# Patient Record
Sex: FEMALE | Race: WHITE | Employment: OTHER | RURAL
[De-identification: names, ages, dates, MRNs, and addresses within clinical notes are randomized per-mention and may not be internally consistent; named-entity substitution may affect disease eponyms.]

---

## 2017-10-05 ENCOUNTER — TELEPHONE (OUTPATIENT)
Dept: FAMILY MEDICINE CLINIC | Age: 62
End: 2017-10-05

## 2017-10-05 ENCOUNTER — OFFICE VISIT (OUTPATIENT)
Dept: FAMILY MEDICINE CLINIC | Age: 62
End: 2017-10-05

## 2017-10-05 VITALS
OXYGEN SATURATION: 98 % | HEIGHT: 60 IN | SYSTOLIC BLOOD PRESSURE: 166 MMHG | RESPIRATION RATE: 18 BRPM | TEMPERATURE: 97.8 F | WEIGHT: 133.6 LBS | HEART RATE: 82 BPM | DIASTOLIC BLOOD PRESSURE: 92 MMHG | BODY MASS INDEX: 26.23 KG/M2

## 2017-10-05 DIAGNOSIS — B02.9 HERPES ZOSTER WITHOUT COMPLICATION: Primary | ICD-10-CM

## 2017-10-05 RX ORDER — FAMCICLOVIR 500 MG/1
500 TABLET ORAL 3 TIMES DAILY
Qty: 21 TAB | Refills: 0 | Status: SHIPPED | OUTPATIENT
Start: 2017-10-05 | End: 2017-10-12

## 2017-10-05 NOTE — PROGRESS NOTES
Chief Complaint   Patient presents with    Rash     sternum and back         HPI:         is a 57 y.o. female who notes the onset of a skin problem. The details are as follows: Woke up today with right upper back pain. Noticed a rash on her right breast.  Came to the office. Tired. No fever. Painful over areas where rash is visible. No Known Allergies    Current Outpatient Prescriptions   Medication Sig    famciclovir (FAMVIR) 500 mg tablet Take 1 Tab by mouth three (3) times daily for 7 days.  escitalopram (LEXAPRO) 10 mg tablet TAKE ONE TABLET BY MOUTH EVERY DAY     No current facility-administered medications for this visit. Past Medical History:   Diagnosis Date    Alcoholic ([address]     Depression      2          ROS:  Denies fever, chills, cough, chest pain, SOB,  nausea, vomiting, or diarrhea. Denies wt loss, wt gain, hemoptysis, hematochezia or melena. Physical Examination:    Visit Vitals    BP (!) 166/92 (BP 1 Location: Left arm, BP Patient Position: Sitting)    Pulse 82    Temp 97.8 °F (36.6 °C) (Temporal)    Resp 18    Ht 5' (1.524 m)    Wt 133 lb 9.6 oz (60.6 kg)    SpO2 98%    BMI 26.09 kg/m2      General:  Alert, cooperative, no distress. Head:  Normocephalic, without obvious abnormality, atraumatic. Eyes:  Conjunctivae/corneas clear. Pupils equal, round, reactive to light. Chest wall:  No tenderness or deformity. Extremities: Extremities normal, atraumatic, no cyanosis or edema. Skin:         Lymph nodes: Cervical and supraclavicular nodes are normal.   Neurologic: Moves all extremities, fluent speech         ASSESSMENT AND PLAN:    1. Shingles:  Famciclovir 500 mg TID for a week. Advil prn. Education. RTC if sx worsen    Orders Placed This Encounter    famciclovir (FAMVIR) 500 mg tablet     Sig: Take 1 Tab by mouth three (3) times daily for 7 days.      Dispense:  21 Tab     Refill:  0       Sheran Meigs, MD, [place]

## 2017-10-05 NOTE — MR AVS SNAPSHOT
Visit Information Date & Time Provider Department Dept. Phone Encounter #  
 10/5/2017 11:30 AM Serafin Renteria MD [address] [place] [address] [phone] [id number] Upcoming Health Maintenance Date Due Hepatitis C Screening 1955 Pneumococcal 19-64 Medium Risk (1 of 1 - PPSV23) 7/8/1972 DTaP/Tdap/Td series (1 - Tdap) 7/8/1974 PAP AKA CERVICAL CYTOLOGY 7/8/1974 BREAST CANCER SCRN MAMMOGRAM 7/8/2003 FOBT Q 1 YEAR AGE 50-75 7/8/2003 ZOSTER VACCINE AGE 60> 5/8/2013 INFLUENZA AGE 9 TO ADULT 8/1/2017 Allergies as of 10/5/2017  Review Complete On: 10/5/2017 By: Trini Duff RN No Known Allergies Current Immunizations  Never Reviewed No immunizations on file. Not reviewed this visit Vitals BP Pulse Temp Resp Height(growth percentile) (!) 166/92 (BP 1 Location: Left arm, BP Patient Position: Sitting) 82 97.8 °F (36.6 °C) (Temporal) 18 5' (1.524 m) Weight(growth percentile) SpO2 BMI OB Status Smoking Status 133 lb 9.6 oz (60.6 kg) 98% 26.09 kg/m2 Postmenopausal Smoker, Current Status Unknown Vitals History BMI and BSA Data Body Mass Index Body Surface Area 26.09 kg/m 2 1.6 m 2 Preferred Pharmacy Pharmacy Name Phone [address], [address] [place] [address] Jessenia Feliciano [facility] [phone] Your Updated Medication List  
  
   
This list is accurate as of: 10/5/17 12:48 PM.  Always use your most recent med list.  
  
  
  
  
 escitalopram oxalate 10 mg tablet Commonly known as:  Talat Prado TAKE ONE TABLET BY MOUTH EVERY DAY Introducing [facility] & HE[facility] SERVICES! Tiffanie Ellison introduces [employer] patient portal. Now you can access parts of your medical record, email your doctor's office, and request medication refills online. 1. In your internet browser, go to [email or web address] 2. Click on the First Time User? Click Here link in the Sign In box.  You will see the New Member Sign Up page. 3. Enter your ePatientFinder Access Code exactly as it appears below. You will not need to use this code after youve completed the sign-up process. If you do not sign up before the expiration date, you must request a new code. · ePatientFinder Access Code: [email or web address] Expires: 1/3/2018 12:48 PM 
 
4. Enter the last four digits of your Social Security Number (xxxx) and Date of Birth (mm/dd/yyyy) as indicated and click Submit. You will be taken to the next sign-up page. 5. Create a ePatientFinder ID. This will be your ePatientFinder login ID and cannot be changed, so think of one that is secure and easy to remember. 6. Create a ePatientFinder password. You can change your password at any time. 7. Enter your Password Reset Question and Answer. This can be used at a later time if you forget your password. 8. Enter your e-mail address. You will receive e-mail notification when new information is available in [address] 9. Click Sign Up. You can now view and download portions of your medical record. 10. Click the Download Summary menu link to download a portable copy of your medical information. If you have questions, please visit the Frequently Asked Questions section of the ePatientFinder website. Remember, ePatientFinder is NOT to be used for urgent needs. For medical emergencies, dial 911. Now available from your iPhone and Android! Please provide this summary of care documentation to your next provider. Your primary care clinician is listed as Shaggy Kohli. If you have any questions after today's visit, please call [phone].

## 2017-11-08 ENCOUNTER — TELEPHONE (OUTPATIENT)
Dept: FAMILY MEDICINE CLINIC | Age: 62
End: 2017-11-08

## 2017-11-08 NOTE — TELEPHONE ENCOUNTER
----- Message from Becca England sent at 11/8/2017 10:11 AM EST -----  Regarding: /Telephone  Pt called requesting an appt for Friday. Pt is experiencing still some pain with shingles. Pt best contact number is [phone].

## 2017-11-09 ENCOUNTER — OFFICE VISIT (OUTPATIENT)
Dept: FAMILY MEDICINE CLINIC | Age: 62
End: 2017-11-09

## 2017-11-09 VITALS
WEIGHT: 133 LBS | BODY MASS INDEX: 25.97 KG/M2 | RESPIRATION RATE: 16 BRPM | DIASTOLIC BLOOD PRESSURE: 78 MMHG | HEART RATE: 117 BPM | SYSTOLIC BLOOD PRESSURE: 158 MMHG | TEMPERATURE: 98.3 F | OXYGEN SATURATION: 97 %

## 2017-11-09 DIAGNOSIS — B02.29 OTHER POSTHERPETIC NERVOUS SYSTEM INVOLVEMENT: Primary | ICD-10-CM

## 2017-11-09 RX ORDER — GABAPENTIN 300 MG/1
CAPSULE ORAL
Qty: 90 CAP | Refills: 1 | Status: SHIPPED | OUTPATIENT
Start: 2017-11-09 | End: 2017-12-14 | Stop reason: SDUPTHER

## 2017-11-09 NOTE — PROGRESS NOTES
[address] [address] [facility] [place]   [phone]     2017  Chief Complaint   Patient presents with    Shingles     still in a lot of pain       HPI  Ms. Marie Alvarez is a 57 y.o. female presents in follow up with continued shingles pain. States the rash has improved but the pain is persistent. Past Medical History:   Diagnosis Date    Alcoholic ([address]     Depression      2        No Known Allergies    Current Outpatient Prescriptions   Medication Sig    gabapentin (NEURONTIN) 300 mg capsule Begin with 1 tablet nightly may increase to 1 tablet 3 x per day  Indications: POSTHERPETIC NEURALGIA    escitalopram (LEXAPRO) 10 mg tablet TAKE ONE TABLET BY MOUTH EVERY DAY     No current facility-administered medications for this visit. Visit Vitals    /78 (BP 1 Location: Left arm, BP Patient Position: Sitting)    Pulse (!) 117    Temp 98.3 °F (36.8 °C) (Oral)    Resp 16    Wt 133 lb (60.3 kg)    SpO2 97%    BMI 25.97 kg/m2     Physical Exam   Vitals reviewed. ICD-10-CM ICD-9-CM    1.  Other postherpetic nervous system involvement B02.29 053.19            Follow-up Disposition: Not on File      [place], [place]

## 2017-11-09 NOTE — MR AVS SNAPSHOT
Visit Information Date & Time Provider Department Dept. Phone Encounter #  
 11/9/2017  2:30 PM Bladimir Tamie, [address] [phone] [id number] Upcoming Health Maintenance Date Due Hepatitis C Screening 1955 Pneumococcal 19-64 Medium Risk (1 of 1 - PPSV23) 7/8/1972 DTaP/Tdap/Td series (1 - Tdap) 7/8/1974 PAP AKA CERVICAL CYTOLOGY 7/8/1974 BREAST CANCER SCRN MAMMOGRAM 7/8/2003 FOBT Q 1 YEAR AGE 50-75 7/8/2003 ZOSTER VACCINE AGE 60> 5/8/2013 Influenza Age 5 to Adult 8/1/2017 Allergies as of 11/9/2017  Review Complete On: 11/9/2017 By: PASTOR Morris No Known Allergies Current Immunizations  Never Reviewed No immunizations on file. Not reviewed this visit You Were Diagnosed With   
  
 Codes Comments Other postherpetic nervous system involvement    -  Primary ICD-10-CM: B02.29 ICD-9-CM: 053.19 Vitals BP Pulse Temp Resp Weight(growth percentile) SpO2  
 158/78 (BP 1 Location: Left arm, BP Patient Position: Sitting) (!) 117 98.3 °F (36.8 °C) (Oral) 16 133 lb (60.3 kg) 97% BMI OB Status Smoking Status 25.97 kg/m2 Postmenopausal Smoker, Current Status Unknown BMI and BSA Data Body Mass Index Body Surface Area  
 25.97 kg/m 2 1.6 m 2 Preferred Pharmacy Pharmacy Name Phone [address], [address] [place] [address] Jessenia Hutchinscarly Alexander [phone] Your Updated Medication List  
  
   
This list is accurate as of: 11/9/17  2:56 PM.  Always use your most recent med list.  
  
  
  
  
 escitalopram oxalate 10 mg tablet Commonly known as:  Zac Benigno TAKE ONE TABLET BY MOUTH EVERY DAY  
  
 gabapentin 300 mg capsule Commonly known as:  NEURONTIN Begin with 1 tablet nightly may increase to 1 tablet 3 x per day  Indications: POSTHERPETIC NEURALGIA Prescriptions Sent to Pharmacy Refills gabapentin (NEURONTIN) 300 mg capsule 1 Sig: Begin with 1 tablet nightly may increase to 1 tablet 3 x per day  Indications: POSTHERPETIC NEURALGIA Class: Normal  
 Pharmacy: [address], [address] Jessenia Becerra  #: [phone] Introducing [facility] & HEALTH SERVICES! [facility] introduces [employer] patient portal. Now you can access parts of your medical record, email your doctor's office, and request medication refills online. 1. In your internet browser, go to [email or web address] 2. Click on the First Time User? Click Here link in the Sign In box. You will see the New Member Sign Up page. 3. Enter your [employer] Access Code exactly as it appears below. You will not need to use this code after youve completed the sign-up process. If you do not sign up before the expiration date, you must request a new code. · [employer] Access Code: [email or web address] Expires: 1/3/2018 11:48 AM 
 
4. Enter the last four digits of your Social Security Number (xxxx) and Date of Birth (mm/dd/yyyy) as indicated and click Submit. You will be taken to the next sign-up page. 5. Create a [employer] [facility] ID. This will be your [employer] login ID and cannot be changed, so think of one that is secure and easy to remember. 6. Create a [employer] password. You can change your password at any time. 7. Enter your Password Reset Question and Answer. This can be used at a later time if you forget your password. 8. Enter your e-mail address. You will receive e-mail notification when new information is available in [address] 9. Click Sign Up. You can now view and download portions of your medical record. 10. Click the Download Summary menu link to download a portable copy of your medical information. If you have questions, please visit the Frequently Asked Questions section of the [employer] website. Remember, [employer] is NOT to be used for urgent needs. For medical emergencies, dial 911. Now available from your iPhone and Android! Please provide this summary of care documentation to your next provider. Your primary care clinician is listed as Abhi Umana. If you have any questions after today's visit, please call [phone].

## 2017-12-14 RX ORDER — GABAPENTIN 300 MG/1
CAPSULE ORAL
Qty: 90 CAP | Refills: 1 | Status: SHIPPED | OUTPATIENT
Start: 2017-12-14 | End: 2018-01-24 | Stop reason: SDUPTHER

## 2018-01-24 RX ORDER — GABAPENTIN 300 MG/1
CAPSULE ORAL
Qty: 90 CAP | Refills: 1 | Status: SHIPPED | OUTPATIENT
Start: 2018-01-24

## 2018-01-31 ENCOUNTER — OFFICE VISIT (OUTPATIENT)
Dept: FAMILY MEDICINE CLINIC | Age: 63
End: 2018-01-31

## 2018-01-31 VITALS
SYSTOLIC BLOOD PRESSURE: 122 MMHG | BODY MASS INDEX: 25.72 KG/M2 | TEMPERATURE: 97.2 F | WEIGHT: 131 LBS | RESPIRATION RATE: 22 BRPM | HEIGHT: 60 IN | DIASTOLIC BLOOD PRESSURE: 80 MMHG | HEART RATE: 64 BPM

## 2018-01-31 DIAGNOSIS — F32.9 REACTIVE DEPRESSION: ICD-10-CM

## 2018-01-31 DIAGNOSIS — B02.23 POST-HERPETIC POLYNEUROPATHY: ICD-10-CM

## 2018-01-31 DIAGNOSIS — Z12.11 COLON CANCER SCREENING: ICD-10-CM

## 2018-01-31 DIAGNOSIS — Z11.59 ENCOUNTER FOR HEPATITIS C SCREENING TEST FOR LOW RISK PATIENT: ICD-10-CM

## 2018-01-31 NOTE — MR AVS SNAPSHOT
Terrence Avelar 
 
 
 [address] 
[phone] Patient: Ruben Kay MRN: [id number] UOZ:[birth year] Visit Information Date & Time Provider Department Dept. Phone Encounter #  
 1/31/2018  9:30 AM Ez Grossman NP [facility] [address] [facility] [phone] [id number] Upcoming Health Maintenance Date Due Hepatitis C Screening 1955 PAP AKA CERVICAL CYTOLOGY 7/8/1974 BREAST CANCER SCRN MAMMOGRAM 7/8/2003 FOBT Q 1 YEAR AGE 50-75 7/8/2003 ZOSTER VACCINE AGE 60> 5/8/2013 DTaP/Tdap/Td series (2 - Td) 1/31/2028 Allergies as of 1/31/2018  Review Complete On: 1/31/2018 By: Viviana Lara RN No Known Allergies Current Immunizations  Never Reviewed No immunizations on file. Not reviewed this visit You Were Diagnosed With   
  
 Codes Comments BMI 25.0-25.9,adult    -  Primary ICD-10-CM: R50.56 ICD-9-CM: V85.21 Colon cancer screening     ICD-10-CM: Z12.11 ICD-9-CM: V76.51 Post-herpetic polyneuropathy     ICD-10-CM: B02.23 
ICD-9-CM: 053.13 Reactive depression     ICD-10-CM: F32.9 ICD-9-CM: 300.4 Encounter for hepatitis C screening test for low risk patient     ICD-10-CM: Z11.59 
ICD-9-CM: V73.89 Vitals BP Pulse Temp Resp Height(growth percentile) 122/80 (BP 1 Location: Left arm, BP Patient Position: Sitting) 64 97.2 °F (36.2 °C) (Temporal) 22 5' (1.524 m) Weight(growth percentile) BMI OB Status Smoking Status 131 lb (59.4 kg) 25.58 kg/m2 Postmenopausal Smoker, Current Status Unknown BMI and BSA Data Body Mass Index Body Surface Area 25.58 kg/m 2 1.59 m 2 Preferred Pharmacy Pharmacy Name Phone [address], [facility] [place] Jessenia Valdez Melv[place] [phone] Your Updated Medication List  
  
   
This list is accurate as of: 1/31/18 10:17 AM.  Always use your most recent med list.  
  
  
  
 escitalopram oxalate 10 mg tablet Commonly known as:  [place]  TAKE ONE TABLET BY MOUTH EVERY DAY  
  
 gabapentin 300 mg capsule Commonly known as:  NEURONTIN Begin with 1 tablet nightly may increase to 1 tablet 3 x per day  Indications: POSTHERPETIC NEURALGIA To-Do List   
 01/31/2018 Lab:  CBC WITH AUTOMATED DIFF   
  
 01/31/2018 Lab:  HEPATITIS C AB   
  
 01/31/2018 Lab:  LIPID PANEL   
  
 01/31/2018 Lab:  METABOLIC PANEL, BASIC   
  
 01/31/2018 Lab:  TSH 3RD GENERATION   
  
 03/02/2018 Lab:  OCCULT BLOOD, IMMUNOASSAY (FIT) Introducing [facility]! [facility] introduces [employer] patient portal. Now you can access parts of your medical record, email your doctor's office, and request medication refills online. 1. In your internet browser, go to [email or web address] 2. Click on the First Time User? Click Here link in the Sign In box. You will see the New Member Sign Up page. 3. Enter your [employer] Access Code exactly as it appears below. You will not need to use this code after youve completed the sign-up process. If you do not sign up before the expiration date, you must request a new code. · [employer] Access Code: [email or web address] Expires: 5/1/2018 10:17 AM 
 
4. Enter the last four digits of your Social Security Number (xxxx) and Date of Birth (mm/dd/yyyy) as indicated and click Submit. You will be taken to the next sign-up page. 5. Create a [employer]t ID. This will be your [employer] login ID and cannot be changed, so think of one that is secure and easy to remember. 6. Create a [employer] password. You can change your password at any time. 7. Enter your Password Reset Question and Answer. This can be used at a later time if you forget your password. 8. Enter your e-mail address. You will receive e-mail notification when new information is available in [address] 9. Click Sign Up. You can now view and download portions of your medical record. 10. Click the Download Summary menu link to download a portable copy of your medical information. If you have questions, please visit the Frequently Asked Questions section of the [employer] website. Remember, [employer] is NOT to be used for urgent needs. For medical emergencies, dial 911. Now available from your iPhone and Android! Please provide this summary of care documentation to your next provider. Your primary care clinician is listed as Tami Montez. If you have any questions after today's visit, please call [phone].

## 2018-02-01 NOTE — PROGRESS NOTES
Subjective:     Niels Art is a 57 y.o. female who presents today with the following:  Chief Complaint   Patient presents with    Shingles     f/u   Niels Art presents for follow up for herpetic discomfort. Outbreak of shingles in October. Decreased gabapentin from tid to bid. Discussed weaning off . She is receptive. HM: labs and screening test to be scheduled in the near future. Shingle lesions were on the chest and underarm right side. Some tenderness persists. BMI:Discussed the patient's BMI with her. The BMI follow up plan is as follows:     dietary management education, guidance, and counseling  encourage exercise  monitor weight  prescribed dietary intake    An After Visit Summary was printed and given to the patient.     COMPLIANT WITH MEDICATION:         ROS:  Gen: denies fever, chills, fatigue, weight loss, weight gain  HEENT:denies blurry vision, nasal congestion, sore throat  Resp: denies dypsnea, cough, wheezing  CV: denies chest pain radiating to the jaws or arms, palpitations, lower extremity edema  Abd: denies nausea, vomiting, diarrhea, constipation  Neuro: denies numbness/tingling  Endo: denies polyuria, polydipsia, heat/cold intolerance  Heme: no lymphadenopathy    No Known Allergies      Current Outpatient Prescriptions:     gabapentin (NEURONTIN) 300 mg capsule, Begin with 1 tablet nightly may increase to 1 tablet 3 x per day  Indications: POSTHERPETIC NEURALGIA, Disp: 90 Cap, Rfl: 1    escitalopram (LEXAPRO) 10 mg tablet, TAKE ONE TABLET BY MOUTH EVERY DAY, Disp: 30 Tab, Rfl: 2    Past Medical History:   Diagnosis Date    [facility] [address] [address] [facility]     Depression      2     Shingles        Past Surgical History:   Procedure Laterality Date    HX GYN       x2    HX GYN      D & C       History   Smoking Status    Smoker, Current Status Unknown   Smokeless Tobacco    Never Used       Social History     Social History    Marital status:      Spouse name: N/A    Number of children: N/A    Years of education: N/A     Social History Main Topics    Smoking status: Smoker, Current Status Unknown    Smokeless tobacco: Never Used    Alcohol use No      Comment:  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 General: Alert and oriented. No acute distress. Well nourished  HEENT :  Ears:TMs are normal. Canals are clear. Eyes: pupils equal, round, react to light and accommodation. Extra ocular movements intact. Nose: patent. Mouth and throat is clear. Neck:supple full range of motion no thyromegaly. Trachea midline, No carotid bruits. No significant lymphadenopathy  Lungs[de-identified] clear to auscultation without wheezes, rales, or rhonchi. Heart :RRR, S1 & S2 are normal intensity. No murmur; no gallop  Abdomen: bowel sounds active. No tenderness, guarding, rebound, masses, hepatic or spleen enlargement  Back: no CVA tenderness. Extremities: without clubbing, cyanosis, or edema  Pulses: radial and femoral pulses are normal  Neuro: HMF intact. Cranial nerves II through XII grossly normal.  Motor: is 5 over 5 and symmetrical.   Deep tendon reflexes: +2 equal    No results found for this or any previous visit. No results found for this visit on 01/31/18. Assessment/ Plan:     Diagnoses and all orders for this visit:    1.  BMI 79.3-38.8,LPCCG  -     METABOLIC PANEL, BASIC; Future  -     LIPID PANEL; Future  -     CBC WITH AUTOMATED DIFF; Future  -     TSH 3RD GENERATION; Future  -     HEPATITIS C AB; Future    2. Colon cancer screening  -     OCCULT BLOOD, IMMUNOASSAY (FIT); Future    3. Post-herpetic polyneuropathy    4. Reactive depression    5. Encounter for hepatitis C screening test for low risk patient  -     HEPATITIS C AB; Future         1. BMI 25.0-25.9,adult    2. Colon cancer screening    3. Post-herpetic polyneuropathy    4. Reactive depression    5. Encounter for hepatitis C screening test for low risk patient        Orders Placed This Encounter    OCCULT BLOOD, IMMUNOASSAY (FIT)     Standing Status:   Future     Standing Expiration Date:   6/73/8773    METABOLIC PANEL, BASIC     Standing Status:   Future     Standing Expiration Date:   7/31/2018    LIPID PANEL     Standing Status:   Future     Standing Expiration Date:   7/31/2018    CBC WITH AUTOMATED DIFF     Standing Status:   Future     Standing Expiration Date:   7/31/2018    TSH 3RD GENERATION     Standing Status:   Future     Standing Expiration Date:   7/31/2018    HEPATITIS C AB     Standing Status:   Future     Standing Expiration Date:   7/31/2018         Verbal and written instructions (see AVS) provided.  Patient expresses understanding of diagnosis and treatment plan.     Follow-up Disposition: Not on File      REN Sroenson

## 2025-08-27 ENCOUNTER — OFFICE VISIT (OUTPATIENT)
Age: 70
End: 2025-08-27
Payer: MEDICARE

## 2025-08-27 VITALS
WEIGHT: 118.8 LBS | SYSTOLIC BLOOD PRESSURE: 152 MMHG | RESPIRATION RATE: 18 BRPM | HEART RATE: 76 BPM | BODY MASS INDEX: 23.32 KG/M2 | TEMPERATURE: 98 F | DIASTOLIC BLOOD PRESSURE: 90 MMHG | HEIGHT: 60 IN | OXYGEN SATURATION: 98 %

## 2025-08-27 DIAGNOSIS — M25.511 ACUTE PAIN OF BOTH SHOULDERS: ICD-10-CM

## 2025-08-27 DIAGNOSIS — Z76.89 ENCOUNTER TO ESTABLISH CARE: Primary | ICD-10-CM

## 2025-08-27 DIAGNOSIS — M25.512 ACUTE PAIN OF BOTH SHOULDERS: ICD-10-CM

## 2025-08-27 PROCEDURE — 1090F PRES/ABSN URINE INCON ASSESS: CPT | Performed by: NURSE PRACTITIONER

## 2025-08-27 PROCEDURE — G8400 PT W/DXA NO RESULTS DOC: HCPCS | Performed by: NURSE PRACTITIONER

## 2025-08-27 PROCEDURE — 1159F MED LIST DOCD IN RCRD: CPT | Performed by: NURSE PRACTITIONER

## 2025-08-27 PROCEDURE — G8427 DOCREV CUR MEDS BY ELIG CLIN: HCPCS | Performed by: NURSE PRACTITIONER

## 2025-08-27 PROCEDURE — 99203 OFFICE O/P NEW LOW 30 MIN: CPT | Performed by: NURSE PRACTITIONER

## 2025-08-27 PROCEDURE — 1123F ACP DISCUSS/DSCN MKR DOCD: CPT | Performed by: NURSE PRACTITIONER

## 2025-08-27 PROCEDURE — G8420 CALC BMI NORM PARAMETERS: HCPCS | Performed by: NURSE PRACTITIONER

## 2025-08-27 PROCEDURE — 3017F COLORECTAL CA SCREEN DOC REV: CPT | Performed by: NURSE PRACTITIONER

## 2025-08-27 PROCEDURE — 1160F RVW MEDS BY RX/DR IN RCRD: CPT | Performed by: NURSE PRACTITIONER

## 2025-08-27 PROCEDURE — 4004F PT TOBACCO SCREEN RCVD TLK: CPT | Performed by: NURSE PRACTITIONER

## 2025-08-27 PROCEDURE — 1126F AMNT PAIN NOTED NONE PRSNT: CPT | Performed by: NURSE PRACTITIONER

## 2025-08-27 RX ORDER — VITAMIN B COMPLEX
1 CAPSULE ORAL DAILY
COMMUNITY

## 2025-08-27 RX ORDER — EVENING PRIMROSE OIL 500 MG
100 CAPSULE ORAL DAILY
COMMUNITY

## 2025-08-27 SDOH — ECONOMIC STABILITY: FOOD INSECURITY: WITHIN THE PAST 12 MONTHS, THE FOOD YOU BOUGHT JUST DIDN'T LAST AND YOU DIDN'T HAVE MONEY TO GET MORE.: NEVER TRUE

## 2025-08-27 SDOH — ECONOMIC STABILITY: FOOD INSECURITY: WITHIN THE PAST 12 MONTHS, YOU WORRIED THAT YOUR FOOD WOULD RUN OUT BEFORE YOU GOT MONEY TO BUY MORE.: NEVER TRUE

## 2025-08-27 ASSESSMENT — PATIENT HEALTH QUESTIONNAIRE - PHQ9
SUM OF ALL RESPONSES TO PHQ QUESTIONS 1-9: 1
2. FEELING DOWN, DEPRESSED OR HOPELESS: SEVERAL DAYS
SUM OF ALL RESPONSES TO PHQ QUESTIONS 1-9: 1
1. LITTLE INTEREST OR PLEASURE IN DOING THINGS: NOT AT ALL

## 2025-08-28 ENCOUNTER — TELEPHONE (OUTPATIENT)
Age: 70
End: 2025-08-28